# Patient Record
Sex: MALE | Race: WHITE | NOT HISPANIC OR LATINO | Employment: FULL TIME | URBAN - METROPOLITAN AREA
[De-identification: names, ages, dates, MRNs, and addresses within clinical notes are randomized per-mention and may not be internally consistent; named-entity substitution may affect disease eponyms.]

---

## 2023-04-21 LAB
ALANINE AMINOTRANSFERASE (SGPT) (U/L) IN SER/PLAS: 38 U/L (ref 10–52)
ALBUMIN (G/DL) IN SER/PLAS: 4.6 G/DL (ref 3.4–5)
ALKALINE PHOSPHATASE (U/L) IN SER/PLAS: 54 U/L (ref 33–120)
ANION GAP IN SER/PLAS: 13 MMOL/L (ref 10–20)
ASPARTATE AMINOTRANSFERASE (SGOT) (U/L) IN SER/PLAS: 25 U/L (ref 9–39)
BILIRUBIN TOTAL (MG/DL) IN SER/PLAS: 0.5 MG/DL (ref 0–1.2)
CALCIUM (MG/DL) IN SER/PLAS: 9.6 MG/DL (ref 8.6–10.6)
CARBON DIOXIDE, TOTAL (MMOL/L) IN SER/PLAS: 27 MMOL/L (ref 21–32)
CHLORIDE (MMOL/L) IN SER/PLAS: 105 MMOL/L (ref 98–107)
CHOLESTEROL (MG/DL) IN SER/PLAS: 231 MG/DL (ref 0–199)
CHOLESTEROL IN HDL (MG/DL) IN SER/PLAS: 29.6 MG/DL
CHOLESTEROL/HDL RATIO: 7.8
CREATININE (MG/DL) IN SER/PLAS: 1.01 MG/DL (ref 0.5–1.3)
GFR MALE: 87 ML/MIN/1.73M2
GLUCOSE (MG/DL) IN SER/PLAS: 90 MG/DL (ref 74–99)
LDL: ABNORMAL MG/DL (ref 0–99)
NON HDL CHOLESTEROL: 201 MG/DL
POTASSIUM (MMOL/L) IN SER/PLAS: 4 MMOL/L (ref 3.5–5.3)
PROSTATE SPECIFIC AG (NG/ML) IN SER/PLAS: 4.64 NG/ML (ref 0–4)
PROTEIN TOTAL: 6.8 G/DL (ref 6.4–8.2)
SODIUM (MMOL/L) IN SER/PLAS: 141 MMOL/L (ref 136–145)
TRIGLYCERIDE (MG/DL) IN SER/PLAS: 460 MG/DL (ref 0–149)
UREA NITROGEN (MG/DL) IN SER/PLAS: 13 MG/DL (ref 6–23)
VLDL: ABNORMAL MG/DL (ref 0–40)

## 2023-09-07 ENCOUNTER — HOSPITAL ENCOUNTER (OUTPATIENT)
Dept: DATA CONVERSION | Facility: HOSPITAL | Age: 58
Discharge: HOME | End: 2023-09-07

## 2023-09-07 DIAGNOSIS — I49.9 CARDIAC ARRHYTHMIA, UNSPECIFIED: ICD-10-CM

## 2023-09-07 LAB
ALT SERPL-CCNC: 26 U/L (ref 5–40)
AST SERPL-CCNC: 21 U/L (ref 5–40)
TSH SERPL DL<=0.05 MIU/L-ACNC: 1.42 MIU/L (ref 0.27–4.2)

## 2023-10-11 ENCOUNTER — APPOINTMENT (OUTPATIENT)
Dept: PRIMARY CARE | Facility: CLINIC | Age: 58
End: 2023-10-11

## 2023-10-20 ENCOUNTER — LAB (OUTPATIENT)
Dept: LAB | Facility: LAB | Age: 58
End: 2023-10-20
Payer: COMMERCIAL

## 2023-10-20 DIAGNOSIS — E78.00 PURE HYPERCHOLESTEROLEMIA, UNSPECIFIED: Primary | ICD-10-CM

## 2023-10-20 LAB
ALT SERPL W P-5'-P-CCNC: 22 U/L (ref 10–52)
AST SERPL W P-5'-P-CCNC: 16 U/L (ref 9–39)
CHOLEST SERPL-MCNC: 239 MG/DL (ref 0–199)
CHOLESTEROL/HDL RATIO: 7.8
HDLC SERPL-MCNC: 30.7 MG/DL
LDLC SERPL CALC-MCNC: 130 MG/DL
NON HDL CHOLESTEROL: 208 MG/DL (ref 0–149)
TRIGL SERPL-MCNC: 393 MG/DL (ref 0–149)
VLDL: 79 MG/DL (ref 0–40)

## 2023-10-20 PROCEDURE — 84460 ALANINE AMINO (ALT) (SGPT): CPT

## 2023-10-20 PROCEDURE — 80061 LIPID PANEL: CPT

## 2023-10-20 PROCEDURE — 84450 TRANSFERASE (AST) (SGOT): CPT

## 2023-10-20 PROCEDURE — 36415 COLL VENOUS BLD VENIPUNCTURE: CPT

## 2023-10-27 DIAGNOSIS — Z79.01 ANTICOAGULATION ADEQUATE: Primary | ICD-10-CM

## 2023-10-27 RX ORDER — APIXABAN 5 MG (74)
5 KIT ORAL DAILY
COMMUNITY
Start: 2023-10-01 | End: 2023-10-27 | Stop reason: SDUPTHER

## 2023-10-27 RX ORDER — APIXABAN 5 MG (74)
5 KIT ORAL 2 TIMES DAILY
Qty: 60 TABLET | Refills: 0 | Status: SHIPPED | OUTPATIENT
Start: 2023-10-27 | End: 2023-10-30 | Stop reason: SDUPTHER

## 2023-10-27 NOTE — TELEPHONE ENCOUNTER
Patient called requesting a refill of his eliquis for a dvt. He was seen in the ED 4 weeks ago for this and is now out of med. He has followed up with cardio since the ED visit.  They told him he may need to be on this the rest of his life.

## 2023-10-30 ENCOUNTER — TELEPHONE (OUTPATIENT)
Dept: PRIMARY CARE | Facility: CLINIC | Age: 58
End: 2023-10-30
Payer: COMMERCIAL

## 2023-10-30 DIAGNOSIS — I82.493 DEEP VEIN THROMBOSIS (DVT) OF OTHER VEIN OF BOTH LOWER EXTREMITIES, UNSPECIFIED CHRONICITY (MULTI): Primary | ICD-10-CM

## 2023-10-31 NOTE — TELEPHONE ENCOUNTER
Patient is taking Eliquis for DVT in right leg.  He was taking the 5 mg twice a day (starter suyapa) but he is not sure if he should continue with the same dosage or not.

## 2023-11-06 PROBLEM — E78.1 PURE HYPERGLYCERIDEMIA: Status: ACTIVE | Noted: 2023-11-06

## 2023-11-06 PROBLEM — I82.90 VENOUS THROMBOSIS: Status: ACTIVE | Noted: 2023-11-06

## 2023-11-06 PROBLEM — E66.9 OBESITY, CLASS I, BMI 30-34.9: Status: ACTIVE | Noted: 2023-10-01

## 2023-11-06 PROBLEM — J30.9 ALLERGIC RHINITIS: Status: ACTIVE | Noted: 2023-11-06

## 2023-11-06 PROBLEM — M22.40 CHONDROMALACIA OF PATELLA: Status: ACTIVE | Noted: 2023-11-06

## 2023-11-06 PROBLEM — N40.0 BENIGN PROSTATIC HYPERPLASIA: Status: ACTIVE | Noted: 2023-09-30

## 2023-11-06 PROBLEM — J30.89 ENVIRONMENTAL AND SEASONAL ALLERGIES: Status: ACTIVE | Noted: 2023-11-06

## 2023-11-06 PROBLEM — I82.411 DVT OF DEEP FEMORAL VEIN, RIGHT (MULTI): Status: ACTIVE | Noted: 2023-09-30

## 2023-11-06 PROBLEM — I25.10 CORONARY ARTERY DISEASE: Status: ACTIVE | Noted: 2023-11-06

## 2023-11-06 PROBLEM — G47.33 OBSTRUCTIVE SLEEP APNEA SYNDROME: Status: ACTIVE | Noted: 2023-11-06

## 2023-11-06 PROBLEM — G56.02 CARPAL TUNNEL SYNDROME OF LEFT WRIST: Status: ACTIVE | Noted: 2023-11-06

## 2023-11-06 PROBLEM — J34.3 HYPERTROPHY OF NASAL TURBINATES: Status: ACTIVE | Noted: 2023-11-06

## 2023-11-06 PROBLEM — I49.9 CARDIAC ARRHYTHMIA: Status: ACTIVE | Noted: 2023-09-30

## 2023-11-06 PROBLEM — E78.5 HYPERLIPIDEMIA: Status: ACTIVE | Noted: 2023-11-06

## 2023-11-06 PROBLEM — H81.10 BENIGN PAROXYSMAL POSITIONAL VERTIGO: Status: ACTIVE | Noted: 2023-11-06

## 2023-11-06 PROBLEM — E66.811 OBESITY, CLASS I, BMI 30-34.9: Status: ACTIVE | Noted: 2023-10-01

## 2023-11-06 RX ORDER — AMIODARONE HYDROCHLORIDE 200 MG/1
200 TABLET ORAL DAILY
COMMUNITY
Start: 2023-06-20 | End: 2023-11-07 | Stop reason: ALTCHOICE

## 2023-11-06 RX ORDER — BEMPEDOIC ACID 180 MG/1
1 TABLET, FILM COATED ORAL DAILY
COMMUNITY
Start: 2023-10-12 | End: 2023-11-07 | Stop reason: ALTCHOICE

## 2023-11-06 RX ORDER — ROSUVASTATIN CALCIUM 20 MG/1
20 TABLET, COATED ORAL DAILY
COMMUNITY
Start: 2023-10-24

## 2023-11-06 RX ORDER — ICOSAPENT ETHYL 1 G/1
2 CAPSULE ORAL 2 TIMES DAILY
COMMUNITY
Start: 2023-05-03 | End: 2023-11-07 | Stop reason: ALTCHOICE

## 2023-11-06 RX ORDER — METOPROLOL SUCCINATE 25 MG/1
25 TABLET, EXTENDED RELEASE ORAL DAILY
COMMUNITY

## 2023-11-06 RX ORDER — SOTALOL HYDROCHLORIDE 80 MG/1
80 TABLET ORAL EVERY 12 HOURS
COMMUNITY
End: 2024-01-17 | Stop reason: SDUPTHER

## 2023-11-06 RX ORDER — TAMSULOSIN HYDROCHLORIDE 0.4 MG/1
0.8 CAPSULE ORAL NIGHTLY
COMMUNITY

## 2023-11-06 RX ORDER — EZETIMIBE 10 MG/1
10 TABLET ORAL NIGHTLY
COMMUNITY

## 2023-11-07 ENCOUNTER — OFFICE VISIT (OUTPATIENT)
Dept: PRIMARY CARE | Facility: CLINIC | Age: 58
End: 2023-11-07
Payer: COMMERCIAL

## 2023-11-07 VITALS
DIASTOLIC BLOOD PRESSURE: 84 MMHG | BODY MASS INDEX: 32.65 KG/M2 | HEART RATE: 79 BPM | HEIGHT: 67 IN | TEMPERATURE: 98.1 F | SYSTOLIC BLOOD PRESSURE: 129 MMHG | WEIGHT: 208 LBS | OXYGEN SATURATION: 99 %

## 2023-11-07 DIAGNOSIS — I82.411 DVT OF DEEP FEMORAL VEIN, RIGHT (MULTI): Primary | ICD-10-CM

## 2023-11-07 DIAGNOSIS — G47.33 OBSTRUCTIVE SLEEP APNEA SYNDROME: ICD-10-CM

## 2023-11-07 DIAGNOSIS — C61 PROSTATE CANCER (MULTI): ICD-10-CM

## 2023-11-07 DIAGNOSIS — M79.671 RIGHT FOOT PAIN: ICD-10-CM

## 2023-11-07 PROBLEM — E66.811 OBESITY, CLASS I, BMI 30-34.9: Status: RESOLVED | Noted: 2023-10-01 | Resolved: 2023-11-07

## 2023-11-07 PROBLEM — J30.9 ALLERGIC RHINITIS: Status: RESOLVED | Noted: 2023-11-06 | Resolved: 2023-11-07

## 2023-11-07 PROBLEM — E66.9 OBESITY, CLASS I, BMI 30-34.9: Status: RESOLVED | Noted: 2023-10-01 | Resolved: 2023-11-07

## 2023-11-07 PROBLEM — E78.1 PURE HYPERGLYCERIDEMIA: Status: RESOLVED | Noted: 2023-11-06 | Resolved: 2023-11-07

## 2023-11-07 PROCEDURE — 99214 OFFICE O/P EST MOD 30 MIN: CPT | Performed by: FAMILY MEDICINE

## 2023-11-07 PROCEDURE — 1036F TOBACCO NON-USER: CPT | Performed by: FAMILY MEDICINE

## 2023-11-07 ASSESSMENT — ENCOUNTER SYMPTOMS
ABDOMINAL PAIN: 1
BRUISES/BLEEDS EASILY: 0
POLYPHAGIA: 0
APPETITE CHANGE: 0
SHORTNESS OF BREATH: 0
COUGH: 0
POLYDIPSIA: 0
PALPITATIONS: 0
ACTIVITY CHANGE: 0
DIFFICULTY URINATING: 0

## 2023-11-07 ASSESSMENT — PATIENT HEALTH QUESTIONNAIRE - PHQ9
1. LITTLE INTEREST OR PLEASURE IN DOING THINGS: NOT AT ALL
2. FEELING DOWN, DEPRESSED OR HOPELESS: NOT AT ALL
SUM OF ALL RESPONSES TO PHQ9 QUESTIONS 1 & 2: 0

## 2023-11-07 ASSESSMENT — PAIN SCALES - GENERAL: PAINLEVEL: 0-NO PAIN

## 2023-11-07 NOTE — PROGRESS NOTES
"Subjective   Patient ID: Heladio Jimenes is a 58 y.o. male who presents for follow up dvt and Cancer (prostate).    Since for follow-up on his chronic medical conditions.  He did develop another DVT of the femoral vein and was hospitalized and placed on Eliquis.  He was originally told that he will be on Coumadin since the Eliquis was expensive but he did manage to get discount from the pharmacy company and is now able to get it for $10 a month and cannot afford that.    He does follow with his urologist for the prostate cancer.    He also had right foot pain and while at that hospitalization he had x-rays.  He subsequently saw a podiatrist in [place] and he has ordered him some orthotics which have not yet arrived.         Review of Systems   Constitutional:  Negative for activity change and appetite change.   HENT:  Negative for congestion.    Respiratory:  Negative for cough and shortness of breath.    Cardiovascular:  Negative for chest pain, palpitations and leg swelling.   Gastrointestinal:  Positive for abdominal pain.   Endocrine: Negative for polydipsia, polyphagia and polyuria.   Genitourinary:  Negative for difficulty urinating.   Hematological:  Does not bruise/bleed easily.       Objective   /84   Pulse 79   Temp 36.7 °C (98.1 °F)   Ht 1.689 m (5' 6.5\")   Wt 94.3 kg (208 lb)   SpO2 99%   BMI 33.07 kg/m²     Physical Exam    Assessment/Plan   Diagnoses and all orders for this visit:  DVT of deep femoral vein, right (CMS/HCC)  Right foot pain  Prostate cancer (CMS/HCC)  Obstructive sleep apnea syndrome  Doing well.  He will continue to work with the podiatrist for the foot pain.  We discussed that he will likely be on the Eliquis lifetime since he has had 2 DVTs now.  He is not having any bruising with this and does not mind taking it.       "

## 2023-11-07 NOTE — PATIENT INSTRUCTIONS
Doing well. Continue your current medicine.   Continue to follow with you urologist and cardiologists.   Up to date on colonoscopy.   As we discussed, if you get into trouble with the Rosuvastatin, you can consider every other day dosing.

## 2023-11-08 ENCOUNTER — APPOINTMENT (OUTPATIENT)
Dept: CARDIOLOGY | Facility: CLINIC | Age: 58
End: 2023-11-08

## 2023-12-15 ENCOUNTER — LAB (OUTPATIENT)
Dept: LAB | Facility: LAB | Age: 58
End: 2023-12-15
Payer: COMMERCIAL

## 2023-12-15 DIAGNOSIS — E78.00 PURE HYPERCHOLESTEROLEMIA, UNSPECIFIED: Primary | ICD-10-CM

## 2023-12-15 LAB
ALT SERPL W P-5'-P-CCNC: 20 U/L (ref 10–52)
AST SERPL W P-5'-P-CCNC: 17 U/L (ref 9–39)
CHOLEST SERPL-MCNC: 146 MG/DL (ref 0–199)
CHOLESTEROL/HDL RATIO: 4.7
HDLC SERPL-MCNC: 31.1 MG/DL
LDLC SERPL CALC-MCNC: 56 MG/DL
NON HDL CHOLESTEROL: 115 MG/DL (ref 0–149)
TRIGL SERPL-MCNC: 293 MG/DL (ref 0–149)
VLDL: 59 MG/DL (ref 0–40)

## 2023-12-15 PROCEDURE — 80061 LIPID PANEL: CPT

## 2023-12-15 PROCEDURE — 84460 ALANINE AMINO (ALT) (SGPT): CPT

## 2023-12-15 PROCEDURE — 36415 COLL VENOUS BLD VENIPUNCTURE: CPT

## 2023-12-15 PROCEDURE — 84450 TRANSFERASE (AST) (SGOT): CPT

## 2023-12-27 ENCOUNTER — APPOINTMENT (OUTPATIENT)
Dept: CARDIOLOGY | Facility: CLINIC | Age: 58
End: 2023-12-27
Payer: COMMERCIAL

## 2024-01-10 ENCOUNTER — ANCILLARY PROCEDURE (OUTPATIENT)
Dept: CARDIOLOGY | Facility: CLINIC | Age: 59
End: 2024-01-10
Payer: COMMERCIAL

## 2024-01-10 ENCOUNTER — OFFICE VISIT (OUTPATIENT)
Dept: CARDIOLOGY | Facility: CLINIC | Age: 59
End: 2024-01-10
Payer: COMMERCIAL

## 2024-01-10 VITALS
HEART RATE: 85 BPM | WEIGHT: 209 LBS | BODY MASS INDEX: 31.67 KG/M2 | DIASTOLIC BLOOD PRESSURE: 80 MMHG | SYSTOLIC BLOOD PRESSURE: 115 MMHG | OXYGEN SATURATION: 97 % | HEIGHT: 68 IN

## 2024-01-10 DIAGNOSIS — I49.1 PAC (PREMATURE ATRIAL CONTRACTION): ICD-10-CM

## 2024-01-10 DIAGNOSIS — I49.9 CARDIAC ARRHYTHMIA: Primary | ICD-10-CM

## 2024-01-10 DIAGNOSIS — R00.2 PALPITATIONS: ICD-10-CM

## 2024-01-10 PROCEDURE — 99214 OFFICE O/P EST MOD 30 MIN: CPT | Performed by: STUDENT IN AN ORGANIZED HEALTH CARE EDUCATION/TRAINING PROGRAM

## 2024-01-10 PROCEDURE — 1036F TOBACCO NON-USER: CPT | Performed by: STUDENT IN AN ORGANIZED HEALTH CARE EDUCATION/TRAINING PROGRAM

## 2024-01-10 PROCEDURE — 93010 ELECTROCARDIOGRAM REPORT: CPT | Performed by: INTERNAL MEDICINE

## 2024-01-10 PROCEDURE — 93005 ELECTROCARDIOGRAM TRACING: CPT

## 2024-01-10 ASSESSMENT — ENCOUNTER SYMPTOMS
FEVER: 0
SHORTNESS OF BREATH: 0
PALPITATIONS: 0
LOSS OF SENSATION IN FEET: 0
OCCASIONAL FEELINGS OF UNSTEADINESS: 0
DIZZINESS: 0
CONFUSION: 0
DEPRESSION: 0

## 2024-01-10 ASSESSMENT — PAIN SCALES - GENERAL: PAINLEVEL: 0-NO PAIN

## 2024-01-10 NOTE — PROGRESS NOTES
"Chief Complaint:   Follow-up (Pt was having a lot of \"extra beats\" palpitations and was started on beta blockers and states he's feeling much better now although he does have some SOB with activity. )     History Of Present Illness:      Heladio Jimenes is a 58 y.o. male referred by Dr Bowers due to symptomatic PACs. He has a PMH of CAD (CT Cardiac score of 108 â€“ LAD - 2018), DVT on Eliquis, KEN, hyperlipidemia. Event monitor showed PACs with 11% burden. Patient was offered ablation but refused it. Echo and stress test from 2022 were normal. He was on metoprolol 25 mg a day. The patient reports feeling MUNOZ when climbing stairs or walking faster. He also describes some episodes of palpitations. This started about 2 years ago. I started him on amiodarone in March 2023.    On his following appointment in June 2023, patient reported feeling better, no more palpitations as before. I stopped his amiodarone and initiated Sotalol and maintained metoprolol.    Today, patient still feeling well overall, no palpitations. ECG shows sinus rhythm with HR 79 bpm, no PACs.       Last Recorded Vitals:  Vitals:    01/10/24 1548   BP: 115/80   BP Location: Right arm   Patient Position: Sitting   Pulse: 85   SpO2: 97%   Weight: 94.8 kg (209 lb)   Height: 1.727 m (5' 8\")       Past Medical History:  He has a past medical history of Cancer (CMS/HCC) and High triglycerides.    Past Surgical History:  He has a past surgical history that includes Colonoscopy w/ biopsies.      Social History:  He reports that he has never smoked. He has never been exposed to tobacco smoke. He has never used smokeless tobacco. He reports that he does not drink alcohol and does not use drugs.    Family History:  Family History   Problem Relation Name Age of Onset    No Known Problems Mother      Hyperlipidemia Father      Polycystic ovary syndrome Daughter      No Known Problems Son          Allergies:  Pollen extracts, Ragweed pollen, and " Simvastatin    Outpatient Medications:  Current Outpatient Medications   Medication Instructions    apixaban (ELIQUIS) 5 mg, oral, 2 times daily    ezetimibe (ZETIA) 10 mg, oral, Nightly    metoprolol succinate XL (TOPROL-XL) 25 mg, oral, Daily, Pt takes 50mg twice weekly on Monday and thursday 25mg the rest of the days    multivit-min/ferrous fumarate (MULTI VITAMIN ORAL) 1 tablet, oral, Daily RT    rosuvastatin (CRESTOR) 20 mg, oral, Daily    sotalol (BETAPACE) 80 mg, oral, Every 12 hours    tamsulosin (FLOMAX) 0.8 mg, oral, Nightly       Review of Systems   Constitutional:  Negative for fever.   Respiratory:  Negative for shortness of breath.    Cardiovascular:  Negative for chest pain, palpitations and leg swelling.        As per history.   Neurological:  Negative for dizziness and syncope.   Psychiatric/Behavioral:  Negative for confusion.       Physical Exam  Constitutional:       Appearance: Normal appearance.   Cardiovascular:      Rate and Rhythm: Normal rate and regular rhythm.      Heart sounds: No murmur heard.     No friction rub. No gallop.   Pulmonary:      Effort: Pulmonary effort is normal.      Breath sounds: Normal breath sounds.   Abdominal:      Palpations: Abdomen is soft.   Musculoskeletal:      Cervical back: Neck supple.   Neurological:      Mental Status: He is alert.   Psychiatric:         Mood and Affect: Mood normal.         Behavior: Behavior normal.           Last Labs:  CBC -  Lab Results   Component Value Date    WBC 6.2 04/18/2022    HGB 14.2 04/18/2022    HCT 43.2 04/18/2022    MCV 90.4 04/18/2022     04/18/2022       CMP -  Lab Results   Component Value Date    CALCIUM 9.6 04/21/2023    PROT 6.8 04/21/2023    ALBUMIN 4.6 04/21/2023    ALBUMIN 4.5 04/18/2022    AST 17 12/15/2023    ALT 20 12/15/2023    ALKPHOS 54 04/21/2023    BILITOT 0.5 04/21/2023       LIPID PANEL -   Lab Results   Component Value Date    CHOL 146 12/15/2023    TRIG 293 (H) 12/15/2023    HDL 31.1  "12/15/2023    CHHDL 4.7 12/15/2023    LDLF - 04/21/2023    VLDL 59 (H) 12/15/2023    NHDL 115 12/15/2023       RENAL FUNCTION PANEL -   Lab Results   Component Value Date    GLUCOSE 90 04/21/2023     04/21/2023    K 4.0 04/21/2023     04/21/2023    CO2 27 04/21/2023    ANIONGAP 13 04/21/2023    BUN 13 04/21/2023    CREATININE 1.01 04/21/2023    GFRMALE 87 04/21/2023    CALCIUM 9.6 04/21/2023    ALBUMIN 4.6 04/21/2023    ALBUMIN 4.5 04/18/2022        No results found for: \"BNP\", \"HGBA1C\"    Last Cardiology Tests:    Cardiac Imaging:  CT cardiac score (2018)    FINDINGS:  The score and distribution of calcium in the coronary arteries is as  follows:     LM 0,  , a dense segmental atherosclerotic calcification of distal  LAD.  LCx 0,  RCA 0,     Total   108      Assessment/Plan   Diagnoses and all orders for this visit:  Cardiac arrhythmia  -     ECG 12 lead (Clinic Performed)  PAC (premature atrial contraction)  Palpitations    PACs with 11% burden. Patient was offered ablation but refused it. Echo and stress test from 2022 were normal. He was on metoprolol 25 mg a day. The patient reports feeling MUNOZ when climbing stairs or walking faster. He also described some episodes of palpitations. This started about 2 years ago. I started him on amiodarone in March 2023.    On his following appointment in June 2023, patient reported feeling better, no more palpitations as before. I stopped his amiodarone, initiated Sotalol and maintained metoprolol.    Today, patient still feeling well overall, no palpitations. ECG shows sinus rhythm with HR 79 bpm, no PACs.    No changes. Patient will continue to follow with Dr. Bowers. Will follow PRN.    Yaneth Batista MD  "

## 2024-01-12 DIAGNOSIS — I49.1 ATRIAL PREMATURE DEPOLARIZATION: ICD-10-CM

## 2024-01-17 RX ORDER — SOTALOL HYDROCHLORIDE 80 MG/1
TABLET ORAL
Qty: 180 TABLET | Refills: 1 | Status: SHIPPED | OUTPATIENT
Start: 2024-01-17 | End: 2024-03-21 | Stop reason: SDUPTHER

## 2024-01-19 ENCOUNTER — TELEPHONE (OUTPATIENT)
Dept: VASCULAR MEDICINE | Facility: HOSPITAL | Age: 59
End: 2024-01-19

## 2024-01-21 LAB
ATRIAL RATE: 79 BPM
P AXIS: 57 DEGREES
P OFFSET: 185 MS
P ONSET: 135 MS
PR INTERVAL: 166 MS
Q ONSET: 218 MS
QRS COUNT: 13 BEATS
QRS DURATION: 98 MS
QT INTERVAL: 406 MS
QTC CALCULATION(BAZETT): 465 MS
QTC FREDERICIA: 445 MS
R AXIS: 29 DEGREES
T AXIS: 21 DEGREES
T OFFSET: 421 MS
VENTRICULAR RATE: 79 BPM

## 2024-01-22 ENCOUNTER — TELEMEDICINE (OUTPATIENT)
Dept: PRIMARY CARE | Facility: CLINIC | Age: 59
End: 2024-01-22
Payer: COMMERCIAL

## 2024-01-22 DIAGNOSIS — U07.1 COVID-19: Primary | ICD-10-CM

## 2024-01-22 PROCEDURE — 99213 OFFICE O/P EST LOW 20 MIN: CPT | Performed by: FAMILY MEDICINE

## 2024-01-22 RX ORDER — NIRMATRELVIR AND RITONAVIR 300-100 MG
3 KIT ORAL 2 TIMES DAILY
Qty: 30 TABLET | Refills: 0 | Status: SHIPPED | OUTPATIENT
Start: 2024-01-22 | End: 2024-01-27

## 2024-01-22 NOTE — PROGRESS NOTES
Subjective   Patient ID: Heladio Jimenes is a 59 y.o. male who presents for No chief complaint on file..    HPI   He presents to the call today having tested positive for COVID this morning.  2 days ago, on Saturday, he developed symptoms of nasal congestion cough feeling tired.  Tested this morning and is positive.  He would like to treat this with the Paxlovid if he can get over this more quickly.  His daughter was on Paxlovid and she did well with that.  Review of Systems    Objective   There were no vitals taken for this visit.    Physical Exam  , No apparent stress, his affect is pleasant.  No coughing during the visit.  Respirations are easy.  Skin and sclera nonicteric.  Does appear somewhat tired.    Assessment/Plan   Diagnoses and all orders for this visit:  COVID-19  -     nirmatrelvir-ritonavir (Paxlovid) 300 mg (150 mg x 2)-100 mg tablet therapy pack; Take 3 tablets by mouth 2 times a day for 5 days. Follow the instructions on the package  Will treat with the Paxil bid but he will stop taking the rosuvastatin during this time, take tamsulosin every other day and take the Eliquis once a day or not at all.  These medicines can all go up in concentration on the Paxlovid.    Let me know if he is not improving over the next few days or if anything worsens.    This call was done using the epic software where I can see Heladio and speak with him.

## 2024-03-21 DIAGNOSIS — I49.1 ATRIAL PREMATURE DEPOLARIZATION: ICD-10-CM

## 2024-03-25 RX ORDER — EZETIMIBE 10 MG/1
10 TABLET ORAL NIGHTLY
Qty: 90 TABLET | Refills: 2 | OUTPATIENT
Start: 2024-03-25 | End: 2024-12-20

## 2024-03-25 RX ORDER — SOTALOL HYDROCHLORIDE 80 MG/1
80 TABLET ORAL EVERY 12 HOURS
Qty: 180 TABLET | Refills: 0 | Status: SHIPPED | OUTPATIENT
Start: 2024-03-25 | End: 2024-06-23

## 2024-03-25 NOTE — TELEPHONE ENCOUNTER
Not sure who fills his Zetia. And patient is only following with us PRN so I sent 90 days of Sotalol and I am guessing general cardiology will continue to fill it

## 2024-06-20 DIAGNOSIS — I49.1 ATRIAL PREMATURE DEPOLARIZATION: ICD-10-CM

## 2024-06-21 RX ORDER — SOTALOL HYDROCHLORIDE 80 MG/1
80 TABLET ORAL EVERY 12 HOURS
Qty: 180 TABLET | Refills: 0 | Status: SHIPPED | OUTPATIENT
Start: 2024-06-21 | End: 2024-09-19

## 2024-08-24 DIAGNOSIS — I49.1 ATRIAL PREMATURE DEPOLARIZATION: ICD-10-CM

## 2024-08-26 RX ORDER — METOPROLOL SUCCINATE 25 MG/1
TABLET, EXTENDED RELEASE ORAL
Qty: 106 TABLET | Refills: 3 | Status: SHIPPED | OUTPATIENT
Start: 2024-08-26

## 2024-09-11 PROBLEM — R93.1 ELEVATED CORONARY ARTERY CALCIUM SCORE: Status: ACTIVE | Noted: 2024-09-11

## 2024-09-11 NOTE — ASSESSMENT & PLAN NOTE
Lipids drawn in December: Tchol 146  HDL 31 LDL 56  ALT 17  Will repeat fasting lipid panel and ALT on return visit.  Continue the combination of rosuvastatin and ezetimibe.

## 2024-09-13 ENCOUNTER — OFFICE VISIT (OUTPATIENT)
Dept: CARDIOLOGY | Facility: CLINIC | Age: 59
End: 2024-09-13
Payer: COMMERCIAL

## 2024-09-13 VITALS
BODY MASS INDEX: 32.99 KG/M2 | WEIGHT: 217 LBS | HEART RATE: 72 BPM | SYSTOLIC BLOOD PRESSURE: 106 MMHG | DIASTOLIC BLOOD PRESSURE: 70 MMHG

## 2024-09-13 DIAGNOSIS — E78.2 MIXED HYPERLIPIDEMIA: ICD-10-CM

## 2024-09-13 DIAGNOSIS — I82.411 DVT OF DEEP FEMORAL VEIN, RIGHT (MULTI): ICD-10-CM

## 2024-09-13 DIAGNOSIS — I49.9 CARDIAC ARRHYTHMIA, UNSPECIFIED CARDIAC ARRHYTHMIA TYPE: ICD-10-CM

## 2024-09-13 DIAGNOSIS — R93.1 ELEVATED CORONARY ARTERY CALCIUM SCORE: Primary | ICD-10-CM

## 2024-09-13 PROCEDURE — 99213 OFFICE O/P EST LOW 20 MIN: CPT | Performed by: INTERNAL MEDICINE

## 2024-09-13 PROCEDURE — 99203 OFFICE O/P NEW LOW 30 MIN: CPT | Performed by: INTERNAL MEDICINE

## 2024-09-13 PROCEDURE — 1036F TOBACCO NON-USER: CPT | Performed by: INTERNAL MEDICINE

## 2024-09-13 ASSESSMENT — PATIENT HEALTH QUESTIONNAIRE - PHQ9
1. LITTLE INTEREST OR PLEASURE IN DOING THINGS: NOT AT ALL
2. FEELING DOWN, DEPRESSED OR HOPELESS: NOT AT ALL
SUM OF ALL RESPONSES TO PHQ9 QUESTIONS 1 AND 2: 0

## 2024-09-13 ASSESSMENT — COLUMBIA-SUICIDE SEVERITY RATING SCALE - C-SSRS: 1. IN THE PAST MONTH, HAVE YOU WISHED YOU WERE DEAD OR WISHED YOU COULD GO TO SLEEP AND NOT WAKE UP?: NO

## 2024-09-13 ASSESSMENT — ENCOUNTER SYMPTOMS
DEPRESSION: 0
OCCASIONAL FEELINGS OF UNSTEADINESS: 0
LOSS OF SENSATION IN FEET: 0

## 2024-09-13 ASSESSMENT — PAIN SCALES - GENERAL: PAINLEVEL: 0-NO PAIN

## 2024-09-13 NOTE — ASSESSMENT & PLAN NOTE
Patient on combination of sotalol and metoprolol ordered by Dr. Batista.  Patient has had episodes of fatigue which could be secondary to the beta-blocker therapy.  Will discontinue the metoprolol at this time but continue sotalol as prescribed.  Will see if fatigue symptoms improved with discontinuing the metoprolol.

## 2024-09-13 NOTE — PROGRESS NOTES
Referred by Dr. Gruber ref. provider found for Cardiac Eval- Prev leandra Bowers      History Of Present Illness:    Heladio Jimenes is a 59 y.o. male presenting with establishing new cardiology care.  The patient does have a history of an elevated coronary artery calcium score, hyperlipidemia and symptomatic PACs and had followed with Dr. Bowers in the past.  He now reports to our office today to establish new cardiology care.  No chest pain or anginal type symptoms.  He does have some mild shortness of breath when he climbs up stairs over the last 6 months.      Past Medical History:  He has a past medical history of Cancer (Multi), Coronary artery disease, DVT of deep femoral vein, right (Multi), and High triglycerides.    Past Surgical History:  He has a past surgical history that includes Colonoscopy w/ biopsies and Leg Surgery.      Social History:  He reports that he has never smoked. He has never been exposed to tobacco smoke. He has never used smokeless tobacco. He reports that he does not drink alcohol and does not use drugs.    Works as a hydraulic fluid     Family History:  Mother  at age 71 he is unsure of the etiology of her death.  Father  at age 84 from complications of lung cancer and kidney failure.     Allergies:  Pollen extracts, Ragweed pollen, and Simvastatin    Outpatient Medications:  Current Outpatient Medications   Medication Instructions    apixaban (ELIQUIS) 5 mg, oral, 2 times daily    ezetimibe (ZETIA) 10 mg, oral, Nightly    multivit-min/ferrous fumarate (MULTI VITAMIN ORAL) 1 tablet, oral, Daily RT    rosuvastatin (CRESTOR) 20 mg, oral, Daily    sotalol (BETAPACE) 80 mg, oral, Every 12 hours    tamsulosin (FLOMAX) 0.8 mg, oral, Nightly        Last Recorded Vitals:  Vitals:    24 0852   BP: 106/70   Pulse: 72   Weight: 98.4 kg (217 lb)       Physical Exam:  Constitutional:       Appearance: Not in distress.   Eyes:      Conjunctiva/sclera: Conjunctivae normal.  "  HENT:    Mouth/Throat:      Pharynx: Oropharynx is clear.   Neck:      Vascular: No carotid bruit. JVD normal.   Pulmonary:      Breath sounds: Normal breath sounds. No wheezing. No rales.   Cardiovascular:      Regular rhythm.      Murmurs: There is no murmur.      No gallop.  No click. No rub.   Abdominal:      Palpations: Abdomen is soft.      Tenderness: There is no abdominal tenderness.   Musculoskeletal:         General: No deformity. Neurological:      General: No focal deficit present.             Last Labs:  CBC -  Lab Results   Component Value Date    WBC 6.2 04/18/2022    HGB 14.2 04/18/2022    HCT 43.2 04/18/2022    MCV 90.4 04/18/2022     04/18/2022       CMP -  Lab Results   Component Value Date    CALCIUM 9.6 04/21/2023    PROT 6.8 04/21/2023    ALBUMIN 4.6 04/21/2023    ALBUMIN 4.5 04/18/2022    AST 17 12/15/2023    ALT 20 12/15/2023    ALKPHOS 54 04/21/2023    BILITOT 0.5 04/21/2023       LIPID PANEL -   Lab Results   Component Value Date    CHOL 146 12/15/2023    TRIG 293 (H) 12/15/2023    HDL 31.1 12/15/2023    CHHDL 4.7 12/15/2023    LDLF - 04/21/2023    VLDL 59 (H) 12/15/2023    NHDL 115 12/15/2023       RENAL FUNCTION PANEL -   Lab Results   Component Value Date    GLUCOSE 90 04/21/2023     04/21/2023    K 4.0 04/21/2023     04/21/2023    CO2 27 04/21/2023    ANIONGAP 13 04/21/2023    BUN 13 04/21/2023    CREATININE 1.01 04/21/2023    GFRMALE 87 04/21/2023    CALCIUM 9.6 04/21/2023    ALBUMIN 4.6 04/21/2023    ALBUMIN 4.5 04/18/2022        No results found for: \"BNP\", \"HGBA1C\"    Last Cardiology Tests:  ECG:  ECG 12 lead (Clinic Performed) 01/10/2024    EKG from March 2024 essentially normal    Echo:  No results found for this or any previous visit from the past 1095 days.      Ejection Fractions:  No results found for: \"EF\"    Cath:  No results found for this or any previous visit from the past 1095 days.      Stress Test:  No results found for this or any previous visit " from the past 1095 days.      Cardiac Imaging:  No results found for this or any previous visit from the past 1095 days.            Assessment/Plan     Hyperlipidemia  Lipids drawn in December: Tchol 146  HDL 31 LDL 56  ALT 17  Will repeat fasting lipid panel and ALT on return visit.  Continue the combination of rosuvastatin and ezetimibe.    Cardiac arrhythmia  Patient on combination of sotalol and metoprolol ordered by Dr. Batista.  Patient has had episodes of fatigue which could be secondary to the beta-blocker therapy.  Will discontinue the metoprolol at this time but continue sotalol as prescribed.  Will see if fatigue symptoms improved with discontinuing the metoprolol.    Elevated coronary artery calcium score  No typical anginal type symptoms.  Will continue standard risk factor modification and follow on a clinical basis.       Dustin Pérez, DO

## 2024-09-13 NOTE — ASSESSMENT & PLAN NOTE
No typical anginal type symptoms.  Will continue standard risk factor modification and follow on a clinical basis.

## 2024-10-08 ENCOUNTER — TELEPHONE (OUTPATIENT)
Dept: CARDIOLOGY | Facility: CLINIC | Age: 59
End: 2024-10-08

## 2024-10-08 DIAGNOSIS — I49.1 ATRIAL PREMATURE DEPOLARIZATION: ICD-10-CM

## 2024-10-08 DIAGNOSIS — I49.9 CARDIAC ARRHYTHMIA, UNSPECIFIED CARDIAC ARRHYTHMIA TYPE: Primary | ICD-10-CM

## 2024-10-08 RX ORDER — METOPROLOL SUCCINATE 25 MG/1
25 TABLET, EXTENDED RELEASE ORAL DAILY
Qty: 40 TABLET | Refills: 11 | Status: SHIPPED | OUTPATIENT
Start: 2024-10-08 | End: 2025-10-08

## 2024-10-08 NOTE — TELEPHONE ENCOUNTER
Pt called the office requesting a refill for metoprolol I informed the pt at his last office visit Dr. Pérez discontinue  metoprolol to see if he notice any improvement regarding him feeling fatigue.  Pt expressed understanding.  Pt stated he did stop metoprolol for one week  He did not notice any improvement  Pt stated he notice heart issues so he resume the metoprolol notice improvement after resuming.  Pt stated he takes metoprolol  2 tabs two days out the week   1 tab the rest of the days   Please advice   Good call back number  [phone]

## 2024-10-10 RX ORDER — METOPROLOL SUCCINATE 25 MG/1
TABLET, EXTENDED RELEASE ORAL
Qty: 106 TABLET | Refills: 3 | Status: SHIPPED | OUTPATIENT
Start: 2024-10-10

## 2024-10-18 ENCOUNTER — APPOINTMENT (OUTPATIENT)
Dept: OTOLARYNGOLOGY | Facility: CLINIC | Age: 59
End: 2024-10-18
Payer: COMMERCIAL

## 2024-10-18 DIAGNOSIS — R13.14 PHARYNGOESOPHAGEAL DYSPHAGIA: Primary | ICD-10-CM

## 2024-10-18 PROCEDURE — 1036F TOBACCO NON-USER: CPT | Performed by: OTOLARYNGOLOGY

## 2024-10-18 PROCEDURE — 99204 OFFICE O/P NEW MOD 45 MIN: CPT | Performed by: OTOLARYNGOLOGY

## 2024-10-18 NOTE — PROGRESS NOTES
"History Of Present Illness  Heladio Jimenes is a 59 y.o. male presenting with: \"Following while eating\".  He is kindly referred by .     He feels like food is hanging in the back side of his throat. This has been going on for about a year. It is off and on. It happens with solid food and pills.    Not a smoker.  He has CPAP.     On examination nasal septum is deviated to right.  Nasal inferior turbinate mucosa looks pale.  Bilateral inferior turbinate congestion.  Fiberoptic examination has shown asymmetrical epiglottis, but no laryngeal mass.  Vocal cords are mobile bilaterally.  No palpable neck mass.    Plan  1-modified barium swallow study  2-follow-up after the study     Past Medical History  He has a past medical history of Cancer (Multi), Coronary artery disease, DVT of deep femoral vein, right (Multi), and High triglycerides.    Surgical History  He has a past surgical history that includes Colonoscopy w/ biopsies and Leg Surgery.     Social History  He reports that he has never smoked. He has never been exposed to tobacco smoke. He has never used smokeless tobacco. He reports that he does not drink alcohol and does not use drugs.    Family History  Family History   Problem Relation Name Age of Onset    No Known Problems Mother      Hyperlipidemia Father      Polycystic ovary syndrome Daughter      No Known Problems Son          Allergies  Pollen extracts, Ragweed pollen, and Simvastatin    Review of Systems   Nasal blockage  Shortness of breath on exertion     Physical Exam    General appearance: Healthy-appearing, well-nourished, well groomed, in no acute distress.     Head and Face: Atraumatic with no masses, lesions, or scarring.      Salivary glands: No tenderness of the parotid glands or parotid masses.     No tenderness of the submandibular glands or submandibular masses.      Facial strength: Normal strength and symmetry, no synkinesis or facial tic.     Eyes: Conjunctivas look non-hyperemic " "bilaterally    Ears: Bilaterally ear canals look normal. Tympanic membranes look intact, no hyperemia, fluid or retraction. Hearing grossly normal.      Nose: Mucosa looks normal. No purulent discharge.     Oral Cavity/Mouth: Lips and tongue look normal.     Throat: No postnasal discharge. No tonsil hypertrophy. No hyperemia.    Neck: Symmetrical, trachea midline.     Pulmonary: Normal respiratory effort.     Lymphatic: No palpable pathologic lymph nodes at neck.     Neurological/Psychiatric Orientation to person, place, and time: Normal.     Mood and affect: Normal.      Extremities: No clubbing.     Skin: No significant skin lesions were noted at face or neck        Procedure  FLEXIBLE LARYNGOSCOPY 10.18.2024  After application of topical lidocaine and decongestant, flexible endoscope was advanced through patient's nasal cavities. Nasal septum was deviated to right. No lesions were noted at nasopharynx. Base of tongue looked normal. Vocal cords and arytenoids were mobile bilaterally. No granulation, hyperemia, polyp, nodule or mass was seen. No interarytenoid thickening, no arytenoid edema or erythema.         Last Recorded Vitals  There were no vitals taken for this visit.    Relevant Results    Assessment and Plan:  Heladio Jimenes is a 59 y.o. male presenting with: \"Following while eating\".  He is kindly referred by .     He feels like food is hanging in the back side of his throat. This has been going on for about a year. It is off and on. It happens with solid food and pills.    Not a smoker.  He has CPAP.     On examination nasal septum is deviated to right.  Nasal inferior turbinate mucosa looks pale.  Bilateral inferior turbinate congestion.  Fiberoptic examination has shown asymmetrical epiglottis, but no laryngeal mass.  Vocal cords are mobile bilaterally.  No palpable neck mass.    Plan  1-modified barium swallow study  2-follow-up after the study    Corazon Daugherty  Otolaryngology - Head & Neck Surgery  "

## 2024-11-29 ENCOUNTER — HOSPITAL ENCOUNTER (OUTPATIENT)
Dept: RADIOLOGY | Facility: HOSPITAL | Age: 59
Discharge: HOME | End: 2024-11-29
Payer: COMMERCIAL

## 2024-11-29 DIAGNOSIS — R13.14 PHARYNGOESOPHAGEAL DYSPHAGIA: ICD-10-CM

## 2024-11-29 PROCEDURE — 2500000005 HC RX 250 GENERAL PHARMACY W/O HCPCS: Performed by: OTOLARYNGOLOGY

## 2024-11-29 PROCEDURE — 74230 X-RAY XM SWLNG FUNCJ C+: CPT

## 2024-11-29 PROCEDURE — 92611 MOTION FLUOROSCOPY/SWALLOW: CPT | Mod: GN

## 2024-11-29 NOTE — PROCEDURES
Speech-Language Pathology    Outpatient Modified Barium Swallow Study    Patient Name: Heladio Jimenes  MRN: [id number]  : 1965  Today's Date: 24             Modified Barium Swallow Study completed. Informed verbal consent obtained prior to completion of exam. Trials of thin, nectar/mildly thick liquid, honey/moderately thick liquid, puree, regular solids and barium tablet with thin liquid were given.     Modified Barium Swallow Study completed. Informed verbal consent obtained prior to completion of exam. The study was completed per protocol with various liquid barium consistencies, pudding, solids and a 13mm barium tablet.  The anatomic structures and function of the oropharynx, larynx, hypopharynx and cervical esophagus were evaluated.    SLP: KAREN Mcnair   Contact info: [employer]; phone: [phone] Option 2    Reason for Referral: patient reports feeling of something solid laying across his throat when eating  Patient Hx per chart:   He has a past medical history of Cancer (Multi), Coronary artery disease, DVT of deep femoral vein, right (Multi), and High triglycerides.     Respiratory Status: Room air  Current diet: regular/thin liquids    Pain:  Pain Scale: 0-10  Ratin    DIET RECOMMENDATIONS:   - Regular (IDDSI Level 7)  - Thin liquids (IDDSI Level 0)  Per the results of today's MBSS, patient to continue baseline diet of regular consistencies and thin liquids following swallow strategies listed below:    STRATEGIES:  - Alternate consistencies      SLP PLAN:  Skilled SLP Services: No further skilled SLP intervention is warranted for dysphagia at this time.      Discussed POC: Patient  Discussed Risks/Benefits: Yes  Patient/Caregiver Agreeable: Yes      Education Provided: Results and recommendations per MBSS, with video review; recommendations and POC at this time. Verbal understanding and agreement given on all accounts.     Treatment Provided Today: No    Additional  Medical Consults Suggested:   - GI  - Esophagram    Repeat Study: Per MD or treating SLP    Mechanics of the Swallow Summary:  ORAL PHASE:  Lip Closure - No labial escape/anterior loss of bolus   Tongue Control During Bolus Hold - Cohesive bolus between tongue to palatal seal   Bolus prep/mastication - Timely and efficient mastication skills   Bolus transport/lingual motion - Brisk tongue motion for A-P movement of the bolus   Oral residue - Trace residue lining oral structures     PHARYNGEAL PHASE:  Initiation of pharyngeal swallow - Bolus head at posterior angle of ramus   Soft palate elevation - No bolus between soft palate/pharyngeal wall   Laryngeal elevation - Complete superior movement of thyroid cartilage with contact of arytenoids to epiglottic petiole   Anterior hyoid excursion - No anterior movement   Epiglottic movement - Complete inversion    Laryngeal vestibule closure - Complete - no air/contrast in laryngeal vestibule   Pharyngeal stripping wave - Complete  Pharyngeal contraction (A/P view) - Not tested       Pharyngoesophageal segment opening - Complete distension and complete duration/no obstruction of flow of bolus   Tongue base retraction - No bolus between tongue base and posterior pharyngeal wall   Pharyngeal residue - Collection of residue within or on the pharyngeal structures clears with reswallow and/or liquid chaser    ESOPHAGEAL PHASE:  Esophageal clearance - Esophageal retention with retrograde flow below the pharyngoesophageal segment   Noted osteophytes throughout cervical spine    SLP Impressions with Severity Rating:   Pt presents with functional swallow upon completion of modified barium swallow study this date. Swallowing physiology is detailed above. Impairments most impacting swallowing safety and efficiency include esophageal retention noted during esophageal screen. Patient demonstrated collection of pharyngea residue with puree and solids, that clears with spontaneous and/or  cued reswallow or liquid chaser. This is NOT the feeling the patient presented to prior to testing. He did not feel his concern during testing. No  penetration was observed for any other consistency, and no aspiration was visualized during study.     *Of note: The A-P bolus follow-through is not intended to be utilized as a diagnostic assessment of the esophagus, rather a tool to observe the biomechanical aspects of the swallow continuum and to inform the need for further evaluation by medical specialists, as applicable.     Strategies attempted- Additional swallow resulted in improved clearance of solids.       OUTCOME MEASURES:  Functional Oral Intake Scale  Functional Oral Intake Scale: Level 7        total oral diet with no restrictions       Eating Assessment Tool (EAT-10)   0=No problem, 1=Mild problem, 2=Mild to moderate problem, 3=Moderate problem, 4=Severe problem    EAT 10  My swallowing problem has caused me to lose weight.: 0  My swallowing problem interferes with my ability to go out for meals.: 0  Swallowing liquids takes extra effort.: 0  Swallowing solids takes extra effort.: 2  Swallowing pills takes extra effort.: 1  Swallowing is painful: 0  The pleasure of eating is affected by my swallowing.: 0  When I swallow food sticks in my throat.: 2  I cough when I eat.: 0  Swallowing is stressful: 0  EAT-10 TOTAL SCORE:: 5    A total score of 3 or above may indicate difficulty with swallowing safely and/or efficiently      Rosenbek's Penetration Aspiration Scale  Thin Liquids: 1. NO ASPIRATION & NO PENETRATION - no aspiration, contrast does not enter airway  [place] Thick Liquids: 1. NO ASPIRATION & NO PENETRATION - no aspiration, contrast does not enter airway  Puree: 1. NO ASPIRATION & NO PENETRATION - no aspiration, contrast does not enter airway  Solids: 1. NO ASPIRATION & NO PENETRATION - no aspiration, contrast does not enter airway

## 2024-12-06 DIAGNOSIS — R13.14 PHARYNGOESOPHAGEAL DYSPHAGIA: Primary | ICD-10-CM

## 2024-12-06 NOTE — PROGRESS NOTES
BA Swallow study has demonstrated impairments that most significantly impact swallowing safety and efficiency, including esophageal retention noted during the esophageal screening. The patient exhibited collection of pharyngeal residue with purees and solids, which clears with spontaneous or cued reswallowing or the use of a liquid chaser.    SP recommend seeing a GI specialist, and esophagram.

## 2024-12-16 DIAGNOSIS — I49.1 ATRIAL PREMATURE DEPOLARIZATION: ICD-10-CM

## 2024-12-16 RX ORDER — SOTALOL HYDROCHLORIDE 80 MG/1
80 TABLET ORAL EVERY 12 HOURS
Qty: 180 TABLET | Refills: 0 | Status: SHIPPED | OUTPATIENT
Start: 2024-12-16 | End: 2025-03-16

## 2025-01-14 ENCOUNTER — HOSPITAL ENCOUNTER (OUTPATIENT)
Dept: RADIOLOGY | Facility: HOSPITAL | Age: 60
Discharge: HOME | End: 2025-01-14
Payer: COMMERCIAL

## 2025-01-14 ENCOUNTER — LAB (OUTPATIENT)
Dept: LAB | Facility: LAB | Age: 60
End: 2025-01-14
Payer: COMMERCIAL

## 2025-01-14 DIAGNOSIS — E78.2 MIXED HYPERLIPIDEMIA: ICD-10-CM

## 2025-01-14 DIAGNOSIS — R13.14 PHARYNGOESOPHAGEAL DYSPHAGIA: ICD-10-CM

## 2025-01-14 LAB
ALT SERPL W P-5'-P-CCNC: 54 U/L (ref 10–52)
CHOLEST SERPL-MCNC: 141 MG/DL (ref 0–199)
CHOLEST/HDLC SERPL: 4.4 {RATIO}
HDLC SERPL-MCNC: 32 MG/DL
LDLC SERPL CALC-MCNC: 66 MG/DL
NON HDL CHOLESTEROL: 109 MG/DL (ref 0–149)
TRIGL SERPL-MCNC: 216 MG/DL (ref 0–149)
VLDL: 43 MG/DL (ref 0–40)

## 2025-01-14 PROCEDURE — 80061 LIPID PANEL: CPT

## 2025-01-14 PROCEDURE — A9698 NON-RAD CONTRAST MATERIALNOC: HCPCS | Performed by: OTOLARYNGOLOGY

## 2025-01-14 PROCEDURE — 84460 ALANINE AMINO (ALT) (SGPT): CPT

## 2025-01-14 PROCEDURE — 74220 X-RAY XM ESOPHAGUS 1CNTRST: CPT

## 2025-01-14 PROCEDURE — 74220 X-RAY XM ESOPHAGUS 1CNTRST: CPT | Performed by: RADIOLOGY

## 2025-01-14 PROCEDURE — 2500000001 HC RX 250 WO HCPCS SELF ADMINISTERED DRUGS (ALT 637 FOR MEDICARE OP): Performed by: OTOLARYNGOLOGY

## 2025-01-14 PROCEDURE — 2500000005 HC RX 250 GENERAL PHARMACY W/O HCPCS: Performed by: OTOLARYNGOLOGY

## 2025-01-14 RX ADMIN — ANTACID/ANTIFLATULENT 1 PACKET: 380; 550; 10; 10 GRANULE, EFFERVESCENT ORAL at 10:19

## 2025-01-14 RX ADMIN — BARIUM SULFATE 150 ML: 960 POWDER, FOR SUSPENSION ORAL at 10:18

## 2025-01-14 RX ADMIN — BARIUM SULFATE 100 ML: 980 POWDER, FOR SUSPENSION ORAL at 10:18

## 2025-02-06 DIAGNOSIS — E78.2 MIXED HYPERLIPIDEMIA: Primary | ICD-10-CM

## 2025-02-06 DIAGNOSIS — I82.493 DEEP VEIN THROMBOSIS (DVT) OF OTHER VEIN OF BOTH LOWER EXTREMITIES, UNSPECIFIED CHRONICITY (MULTI): ICD-10-CM

## 2025-02-06 RX ORDER — ROSUVASTATIN CALCIUM 20 MG/1
20 TABLET, COATED ORAL DAILY
Qty: 90 TABLET | Refills: 3 | Status: SHIPPED | OUTPATIENT
Start: 2025-02-06

## 2025-02-06 RX ORDER — EZETIMIBE 10 MG/1
10 TABLET ORAL NIGHTLY
Qty: 90 TABLET | Refills: 3 | Status: SHIPPED | OUTPATIENT
Start: 2025-02-06

## 2025-03-14 ENCOUNTER — APPOINTMENT (OUTPATIENT)
Dept: CARDIOLOGY | Facility: CLINIC | Age: 60
End: 2025-03-14
Payer: COMMERCIAL

## 2025-03-17 DIAGNOSIS — I49.1 ATRIAL PREMATURE DEPOLARIZATION: ICD-10-CM

## 2025-03-17 RX ORDER — SOTALOL HYDROCHLORIDE 80 MG/1
80 TABLET ORAL EVERY 12 HOURS
Qty: 180 TABLET | Refills: 0 | Status: SHIPPED | OUTPATIENT
Start: 2025-03-17 | End: 2025-06-15

## 2025-04-03 NOTE — ASSESSMENT & PLAN NOTE
Reviewed lipid panel from January: Total cholesterol 141, triglycerides 216, HDL 32 and LDL 66 with an ALT of 54.  Will plan on repeating fasting lipid panel on return visit and check both an ALT and AST.  Will stay on the 20 mg of rosuvastatin and 10 mg of ezetimibe for now.

## 2025-04-03 NOTE — ASSESSMENT & PLAN NOTE
On last visit patient had significant fatigue symptoms.  I discontinued the simple beta-blocker but did continue the sotalol prescribed by Dr. Batista.  The sotalol has been effective at stabilizing heart rhythm.  Will continue the sotalol and metoprolol but consider eliminating the metoprolol altogether on next visit.  Is on Eliquis secondary to DVT in the past.

## 2025-04-07 ENCOUNTER — APPOINTMENT (OUTPATIENT)
Facility: CLINIC | Age: 60
End: 2025-04-07
Payer: COMMERCIAL

## 2025-04-07 VITALS
DIASTOLIC BLOOD PRESSURE: 76 MMHG | HEART RATE: 66 BPM | SYSTOLIC BLOOD PRESSURE: 116 MMHG | WEIGHT: 218 LBS | OXYGEN SATURATION: 96 % | BODY MASS INDEX: 33.15 KG/M2

## 2025-04-07 DIAGNOSIS — R93.1 ELEVATED CORONARY ARTERY CALCIUM SCORE: Primary | ICD-10-CM

## 2025-04-07 DIAGNOSIS — I49.9 CARDIAC ARRHYTHMIA, UNSPECIFIED CARDIAC ARRHYTHMIA TYPE: ICD-10-CM

## 2025-04-07 DIAGNOSIS — E78.2 MIXED HYPERLIPIDEMIA: ICD-10-CM

## 2025-04-07 PROCEDURE — 99213 OFFICE O/P EST LOW 20 MIN: CPT | Performed by: INTERNAL MEDICINE

## 2025-04-07 PROCEDURE — 1036F TOBACCO NON-USER: CPT | Performed by: INTERNAL MEDICINE

## 2025-04-07 ASSESSMENT — ENCOUNTER SYMPTOMS
COUGH: 0
OCCASIONAL FEELINGS OF UNSTEADINESS: 0
HEMATURIA: 0
DEPRESSION: 0
NUMBNESS: 0
DYSURIA: 0
PALPITATIONS: 0
BLURRED VISION: 0
ABDOMINAL PAIN: 0
PARESTHESIAS: 0
SHORTNESS OF BREATH: 0
DYSPNEA ON EXERTION: 0
LOSS OF SENSATION IN FEET: 0

## 2025-04-07 ASSESSMENT — PATIENT HEALTH QUESTIONNAIRE - PHQ9
1. LITTLE INTEREST OR PLEASURE IN DOING THINGS: NOT AT ALL
SUM OF ALL RESPONSES TO PHQ9 QUESTIONS 1 AND 2: 0
2. FEELING DOWN, DEPRESSED OR HOPELESS: NOT AT ALL

## 2025-04-07 ASSESSMENT — LIFESTYLE VARIABLES: TOTAL SCORE: 0

## 2025-04-07 ASSESSMENT — PAIN SCALES - GENERAL: PAINLEVEL_OUTOF10: 3

## 2025-04-07 NOTE — PROGRESS NOTES
Subjective   Heladio Jimenes is a 60 y.o. male.    Chief Complaint:  6 month follow up    HPI  Overall doing well.  No significant palpitations.  No chest pain or anginal type symptoms.    Review of Systems   Constitutional: Negative for malaise/fatigue.   HENT:  Negative for congestion.    Eyes:  Negative for blurred vision.   Cardiovascular:  Negative for chest pain, dyspnea on exertion and palpitations.   Respiratory:  Negative for cough and shortness of breath.    Musculoskeletal:  Negative for joint pain.   Gastrointestinal:  Negative for abdominal pain.   Genitourinary:  Negative for dysuria and hematuria.   Neurological:  Negative for numbness and paresthesias.       Objective   Constitutional:       Appearance: Not in distress.   Eyes:      Conjunctiva/sclera: Conjunctivae normal.   Neck:      Vascular: JVD normal.   Pulmonary:      Breath sounds: Normal breath sounds. No wheezing. No rhonchi. No rales.   Cardiovascular:      Normal rate. Regular rhythm.      Murmurs: There is no murmur.      No gallop.  No click. No rub.   Abdominal:      Palpations: Abdomen is soft.   Neurological:      General: No focal deficit present.      Mental Status: Alert.         Lab Review:   No visits with results within 2 Month(s) from this visit.   Latest known visit with results is:   Lab on 01/14/2025   Component Date Value    ALT 01/14/2025 54 (H)     Cholesterol 01/14/2025 141     HDL-Cholesterol 01/14/2025 32.0     Cholesterol/HDL Ratio 01/14/2025 4.4     LDL Calculated 01/14/2025 66     VLDL 01/14/2025 43 (H)     Triglycerides 01/14/2025 216 (H)     Non HDL Cholesterol 01/14/2025 109        Assessment/Plan   The primary encounter diagnosis was Elevated coronary artery calcium score. Diagnoses of Mixed hyperlipidemia and Cardiac arrhythmia, unspecified cardiac arrhythmia type were also pertinent to this visit.    Hyperlipidemia  Reviewed lipid panel from January: Total cholesterol 141, triglycerides 216, HDL 32 and LDL 66  with an ALT of 54.  Will plan on repeating fasting lipid panel on return visit and check both an ALT and AST.  Will stay on the 20 mg of rosuvastatin and 10 mg of ezetimibe for now.    Cardiac arrhythmia  On last visit patient had significant fatigue symptoms.  I discontinued the simple beta-blocker but did continue the sotalol prescribed by Dr. Batista.  The sotalol has been effective at stabilizing heart rhythm.  Will continue the sotalol and metoprolol but consider eliminating the metoprolol altogether on next visit.  Is on Eliquis secondary to DVT in the past.    Elevated coronary artery calcium score  Continue standard risk factor modification and follow on a clinical basis.

## 2025-06-06 DIAGNOSIS — I49.1 ATRIAL PREMATURE DEPOLARIZATION: ICD-10-CM

## 2025-06-06 NOTE — TELEPHONE ENCOUNTER
Wife called in. [facility] said medication had been discontinued on 4-7-25. 4/7/25 Physicians note shows possible discontinue at next visit. Refill request sent to Dr. Pérez.

## 2025-06-09 RX ORDER — METOPROLOL SUCCINATE 25 MG/1
25 TABLET, EXTENDED RELEASE ORAL DAILY
Qty: 106 TABLET | Refills: 0 | Status: SHIPPED | OUTPATIENT
Start: 2025-06-09

## 2025-06-22 DIAGNOSIS — I49.1 ATRIAL PREMATURE DEPOLARIZATION: ICD-10-CM

## 2025-06-25 RX ORDER — SOTALOL HYDROCHLORIDE 80 MG/1
80 TABLET ORAL EVERY 12 HOURS
Qty: 180 TABLET | Refills: 0 | Status: SHIPPED | OUTPATIENT
Start: 2025-06-25 | End: 2025-09-23

## 2025-06-25 NOTE — TELEPHONE ENCOUNTER
LOV: 01/10/2024  No upcoming appointment scheduled    Requested Prescriptions     Pending Prescriptions Disp Refills    sotalol (Betapace) 80 mg tablet [Pharmacy Med Name: sotalol 80 mg tablet] 180 tablet 0     Sig: Take 1 tablet (80 mg) by mouth every 12 hours.

## 2025-07-08 DIAGNOSIS — I49.1 ATRIAL PREMATURE DEPOLARIZATION: ICD-10-CM

## 2025-07-08 RX ORDER — METOPROLOL SUCCINATE 25 MG/1
25 TABLET, EXTENDED RELEASE ORAL DAILY
Qty: 90 TABLET | Refills: 3 | Status: SHIPPED | OUTPATIENT
Start: 2025-07-08

## 2025-07-08 NOTE — TELEPHONE ENCOUNTER
Pt called to clarify he is supposed to be taking metoprolol, the pharmacy keeps telling him medication was discontinued, there was some confusion with medication being refilled by covering doctor, new request sent to dr dickson

## 2025-07-21 ENCOUNTER — APPOINTMENT (OUTPATIENT)
Dept: SLEEP MEDICINE | Facility: CLINIC | Age: 60
End: 2025-07-21
Payer: COMMERCIAL

## 2025-07-21 VITALS
HEART RATE: 74 BPM | BODY MASS INDEX: 33.34 KG/M2 | DIASTOLIC BLOOD PRESSURE: 76 MMHG | OXYGEN SATURATION: 97 % | WEIGHT: 220 LBS | HEIGHT: 68 IN | SYSTOLIC BLOOD PRESSURE: 118 MMHG

## 2025-07-21 DIAGNOSIS — G47.33 OBSTRUCTIVE SLEEP APNEA SYNDROME: Primary | ICD-10-CM

## 2025-07-21 PROCEDURE — 99214 OFFICE O/P EST MOD 30 MIN: CPT | Performed by: INTERNAL MEDICINE

## 2025-07-21 PROCEDURE — 3008F BODY MASS INDEX DOCD: CPT | Performed by: INTERNAL MEDICINE

## 2025-07-21 PROCEDURE — 1036F TOBACCO NON-USER: CPT | Performed by: INTERNAL MEDICINE

## 2025-07-21 RX ORDER — DULOXETIN HYDROCHLORIDE 20 MG/1
20 CAPSULE, DELAYED RELEASE ORAL DAILY
COMMUNITY

## 2025-07-21 ASSESSMENT — SLEEP AND FATIGUE QUESTIONNAIRES
WORRIED_DISTRESSED_DUE_TO_SLEEP: A LITTLE
HOW LIKELY ARE YOU TO NOD OFF OR FALL ASLEEP WHILE LYING DOWN TO REST IN THE AFTERNOON WHEN CIRCUMSTANCES PERMIT: WOULD NEVER DOZE
HOW LIKELY ARE YOU TO NOD OFF OR FALL ASLEEP IN A CAR, WHILE STOPPED FOR A FEW MINUTES IN TRAFFIC: WOULD NEVER DOZE
HOW LIKELY ARE YOU TO NOD OFF OR FALL ASLEEP WHEN YOU ARE A PASSENGER IN A CAR FOR AN HOUR WITHOUT A BREAK: WOULD NEVER DOZE
HOW LIKELY ARE YOU TO NOD OFF OR FALL ASLEEP WHILE SITTING QUIETLY AFTER LUNCH WITHOUT ALCOHOL: WOULD NEVER DOZE
HOW LIKELY ARE YOU TO NOD OFF OR FALL ASLEEP WHILE SITTING AND TALKING TO SOMEONE: WOULD NEVER DOZE
SATISFACTION_WITH_CURRENT_SLEEP_PATTERN: SATISFIED
ESS-CHAD TOTAL SCORE: 2
SLEEP_PROBLEM_NOTICEABLE_TO_OTHERS: A LITTLE
SITING INACTIVE IN A PUBLIC PLACE LIKE A CLASS ROOM OR A MOVIE THEATER: WOULD NEVER DOZE
HOW LIKELY ARE YOU TO NOD OFF OR FALL ASLEEP WHILE SITTING AND READING: SLIGHT CHANCE OF DOZING
HOW LIKELY ARE YOU TO NOD OFF OR FALL ASLEEP WHILE WATCHING TV: SLIGHT CHANCE OF DOZING
SLEEP_PROBLEM_INTERFERES_DAILY_ACTIVITIES: A LITTLE

## 2025-07-21 ASSESSMENT — PAIN SCALES - GENERAL: PAINLEVEL_OUTOF10: 0-NO PAIN

## 2025-07-21 NOTE — ASSESSMENT & PLAN NOTE
- Heladio Jimenes  has sleep apnea and requires treatment.  - Heladio Jimenes demonstrates good compliance and benefit from PAP therapy  - Continue Auto PAP 5-15 cmH20   - Order for renewal of PAP supplies placed through [facility]    - Follow-up in 12 months

## 2025-08-13 ENCOUNTER — OFFICE VISIT (OUTPATIENT)
Facility: CLINIC | Age: 60
End: 2025-08-13
Payer: COMMERCIAL

## 2025-08-13 VITALS
HEART RATE: 72 BPM | HEIGHT: 68 IN | WEIGHT: 220 LBS | BODY MASS INDEX: 33.34 KG/M2 | SYSTOLIC BLOOD PRESSURE: 122 MMHG | DIASTOLIC BLOOD PRESSURE: 78 MMHG

## 2025-08-13 DIAGNOSIS — I49.1 PAC (PREMATURE ATRIAL CONTRACTION): ICD-10-CM

## 2025-08-13 DIAGNOSIS — R06.09 DYSPNEA ON EFFORT: Primary | ICD-10-CM

## 2025-08-13 PROCEDURE — 99214 OFFICE O/P EST MOD 30 MIN: CPT | Performed by: STUDENT IN AN ORGANIZED HEALTH CARE EDUCATION/TRAINING PROGRAM

## 2025-08-13 PROCEDURE — 1036F TOBACCO NON-USER: CPT | Performed by: STUDENT IN AN ORGANIZED HEALTH CARE EDUCATION/TRAINING PROGRAM

## 2025-08-13 PROCEDURE — 93005 ELECTROCARDIOGRAM TRACING: CPT | Performed by: STUDENT IN AN ORGANIZED HEALTH CARE EDUCATION/TRAINING PROGRAM

## 2025-08-13 PROCEDURE — 99212 OFFICE O/P EST SF 10 MIN: CPT

## 2025-08-13 PROCEDURE — 3008F BODY MASS INDEX DOCD: CPT | Performed by: STUDENT IN AN ORGANIZED HEALTH CARE EDUCATION/TRAINING PROGRAM

## 2025-08-13 ASSESSMENT — COLUMBIA-SUICIDE SEVERITY RATING SCALE - C-SSRS
2. HAVE YOU ACTUALLY HAD ANY THOUGHTS OF KILLING YOURSELF?: NO
6. HAVE YOU EVER DONE ANYTHING, STARTED TO DO ANYTHING, OR PREPARED TO DO ANYTHING TO END YOUR LIFE?: NO
1. IN THE PAST MONTH, HAVE YOU WISHED YOU WERE DEAD OR WISHED YOU COULD GO TO SLEEP AND NOT WAKE UP?: NO

## 2025-08-13 ASSESSMENT — ENCOUNTER SYMPTOMS
LOSS OF SENSATION IN FEET: 0
DEPRESSION: 0
OCCASIONAL FEELINGS OF UNSTEADINESS: 0

## 2025-08-13 ASSESSMENT — PATIENT HEALTH QUESTIONNAIRE - PHQ9
2. FEELING DOWN, DEPRESSED OR HOPELESS: NOT AT ALL
1. LITTLE INTEREST OR PLEASURE IN DOING THINGS: NOT AT ALL
SUM OF ALL RESPONSES TO PHQ9 QUESTIONS 1 AND 2: 0

## 2025-08-13 ASSESSMENT — PAIN SCALES - GENERAL: PAINLEVEL_OUTOF10: 2

## 2025-08-14 LAB
ATRIAL RATE: 72 BPM
P AXIS: 59 DEGREES
P OFFSET: 180 MS
P ONSET: 132 MS
PR INTERVAL: 178 MS
Q ONSET: 221 MS
QRS COUNT: 12 BEATS
QRS DURATION: 88 MS
QT INTERVAL: 428 MS
QTC CALCULATION(BAZETT): 468 MS
QTC FREDERICIA: 455 MS
R AXIS: 50 DEGREES
T AXIS: 26 DEGREES
T OFFSET: 435 MS
VENTRICULAR RATE: 72 BPM

## 2025-08-28 ENCOUNTER — HOSPITAL ENCOUNTER (OUTPATIENT)
Dept: CARDIOLOGY | Facility: HOSPITAL | Age: 60
Discharge: HOME | End: 2025-08-28
Payer: COMMERCIAL

## 2025-08-28 DIAGNOSIS — I49.1 PAC (PREMATURE ATRIAL CONTRACTION): ICD-10-CM

## 2025-08-28 PROCEDURE — 93306 TTE W/DOPPLER COMPLETE: CPT | Performed by: STUDENT IN AN ORGANIZED HEALTH CARE EDUCATION/TRAINING PROGRAM

## 2025-08-28 PROCEDURE — 93306 TTE W/DOPPLER COMPLETE: CPT

## 2025-08-29 LAB
AORTIC VALVE PEAK VELOCITY: 1.35 M/S
AV PEAK GRADIENT: 7 MMHG
AVA (PEAK VEL): 2.58 CM2
EJECTION FRACTION APICAL 4 CHAMBER: 69.5
EJECTION FRACTION: 68 %
LEFT ATRIUM VOLUME AREA LENGTH INDEX BSA: 25.2 ML/M2
LEFT VENTRICLE INTERNAL DIMENSION DIASTOLE: 4.02 CM (ref 3.5–6)
LEFT VENTRICULAR OUTFLOW TRACT DIAMETER: 1.93 CM
MITRAL VALVE E/A RATIO: 1.28
MITRAL VALVE E/E' RATIO: 8.82
RIGHT VENTRICLE FREE WALL PEAK S': 16.48 CM/S
TRICUSPID ANNULAR PLANE SYSTOLIC EXCURSION: 2.6 CM

## 2025-09-17 ENCOUNTER — APPOINTMENT (OUTPATIENT)
Dept: CARDIOLOGY | Facility: CLINIC | Age: 60
End: 2025-09-17
Payer: COMMERCIAL